# Patient Record
Sex: MALE | Race: WHITE | Employment: FULL TIME | URBAN - METROPOLITAN AREA
[De-identification: names, ages, dates, MRNs, and addresses within clinical notes are randomized per-mention and may not be internally consistent; named-entity substitution may affect disease eponyms.]

---

## 2023-09-06 ENCOUNTER — HOSPITAL ENCOUNTER (EMERGENCY)
Age: 23
Discharge: HOME | End: 2023-09-06
Attending: EMERGENCY MEDICINE

## 2023-09-06 VITALS
SYSTOLIC BLOOD PRESSURE: 129 MMHG | HEIGHT: 70 IN | OXYGEN SATURATION: 99 % | DIASTOLIC BLOOD PRESSURE: 74 MMHG | RESPIRATION RATE: 19 BRPM | HEART RATE: 89 BPM | WEIGHT: 206 LBS | TEMPERATURE: 98 F | BODY MASS INDEX: 29.49 KG/M2

## 2023-09-06 DIAGNOSIS — G51.0 BELL'S PALSY: Primary | ICD-10-CM

## 2023-09-06 PROCEDURE — 99283 EMERGENCY DEPT VISIT LOW MDM: CPT

## 2023-09-06 RX ORDER — VALACYCLOVIR HYDROCHLORIDE 1 G/1
1000 TABLET, FILM COATED ORAL 3 TIMES DAILY
Qty: 21 TABLET | Refills: 0 | Status: SHIPPED | OUTPATIENT
Start: 2023-09-06 | End: 2023-09-13

## 2023-09-06 RX ORDER — PREDNISONE 10 MG/1
TABLET ORAL
Qty: 44 TABLET | Refills: 0 | Status: SHIPPED | OUTPATIENT
Start: 2023-09-06 | End: 2023-09-16

## 2023-09-06 ASSESSMENT — PAIN - FUNCTIONAL ASSESSMENT: PAIN_FUNCTIONAL_ASSESSMENT: 0-10

## 2023-09-06 ASSESSMENT — PAIN SCALES - GENERAL: PAINLEVEL_OUTOF10: 2

## 2023-09-06 NOTE — CARE COORDINATION
This nurse checked the patient's BP and it was 129/74. D/c instructions were reviewed and pt stated understanding and did sign them.

## 2023-09-06 NOTE — ED TRIAGE NOTES
Pt states that his right eye was itching  but there was no drainage noted. No redness noted. Pt  is now unable to blink his right eye. He also feels as if his right cheek is numb. Tenderness down towards the jaw.  2/10

## 2023-09-06 NOTE — CARE COORDINATION
I met with the patient regarding his follow up medical care. I provided him with a list of the [facility] pcp's and also a brochure on [facility] and dentistry. He declined my offer to get him an appt for follow up but he declined at this time. I encouraged him to contact a place to go to get his care.

## 2023-09-06 NOTE — ED PROVIDER NOTES
[facility] ED  eMERGENCY dEPARTMENT eNCOUnter      Pt Name: Lindsay Castro  MRN: [id number]  [address] [facility] 2000  Date of evaluation: 9/6/2023  Provider: Hunter Pitts MD    [address]       Chief Complaint   Patient presents with    Eye Problem     Pt started with an itchy right eye and now it will not blink and his  cheek feels swollen         HISTORY OF PRESENT ILLNESS   (Location/Symptom, Timing/Onset,Context/Setting, Quality, Duration, Modifying Factors, Severity)  Note limiting factors. Lindsay Castro is a 24 y.o. male who presents to the emergency department with right-sided facial droop. Patient has no headache blurry double vision nausea vomiting diarrhea diaphoresis heat or cold intolerance head injury. Patient states it started this last Sunday so approximately 3 days ago. Patient comes to the emergency department for further evaluation. No other complaints or concerns. HPI    NursingNotes were reviewed. REVIEW OF SYSTEMS    (2-9 systems for level 4, 10 or more for level 5)     Review of Systems   All other systems reviewed and are negative. Except as noted above the remainder of the review of systems was reviewed and negative. PAST MEDICAL HISTORY   History reviewed. No pertinent past medical history. SURGICALHISTORY     History reviewed. No pertinent surgical history. CURRENT MEDICATIONS       Discharge Medication List as of 9/6/2023 11:46 AM          ALLERGIES     Patient has no known allergies. FAMILY HISTORY     History reviewed. No pertinent family history.        SOCIAL HISTORY       Social History     Socioeconomic History    Marital status: Single     Spouse name: None    Number of children: None    Years of education: None    Highest education level: None   Tobacco Use    Smoking status: Never    Smokeless tobacco: Never   Vaping Use    Vaping Use: Never used   Substance and Sexual Activity    Alcohol use: Never    Drug use: Never    Sexual